# Patient Record
Sex: FEMALE
[De-identification: names, ages, dates, MRNs, and addresses within clinical notes are randomized per-mention and may not be internally consistent; named-entity substitution may affect disease eponyms.]

---

## 2020-04-28 ENCOUNTER — NURSE TRIAGE (OUTPATIENT)
Dept: OTHER | Facility: CLINIC | Age: 65
End: 2020-04-28

## 2020-04-28 NOTE — TELEPHONE ENCOUNTER
Reason for Disposition   Other symptom is present, see that guideline.  (e.g., chest pain, headache, dizziness, abdominal pain, colds, sore throat, etc.).  [1] MODERATE dizziness (e.g., interferes with normal activities) AND [2] has NOT been evaluated by physician for this  (Exception: dizziness caused by heat exposure, sudden standing, or poor fluid intake)    Answer Assessment - Initial Assessment Questions  1. NAUSEA SEVERITY: \"How bad is the nausea? \" (e.g., mild, moderate, severe; dehydration, weight loss)    - MILD: loss of appetite without change in eating habits    - MODERATE: decreased oral intake without significant weight loss, dehydration, or malnutrition    - SEVERE: inadequate caloric or fluid intake, significant weight loss, symptoms of dehydration      Moderate since after lunch today     2. ONSET: \"When did the nausea begin? \"      Today in the afternoon    3. VOMITING: \"Any vomiting? \" If so, ask: \"How many times today? \"       Have not vomited but feels like she could     4. RECURRENT SYMPTOM: \"Have you had nausea before? \" If so, ask: \"When was the last time? \" \"What happened that time? \"       Denies     5. CAUSE: \"What do you think is causing the nausea? \"      Not sure what is causing     6. PREGNANCY: \"Is there any chance you are pregnant? \" (e.g., unprotected intercourse, missed birth control pill, broken condom)      NA    Answer Assessment - Initial Assessment Questions  1. DESCRIPTION: \"Describe your dizziness. \"      Feels too weak to stand, unsteady on feet     2. LIGHTHEADED: \"Do you feel lightheaded? \" (e.g., somewhat faint, woozy, weak upon standing)      Pt feels like she could pass out, weak     3. VERTIGO: \"Do you feel like either you or the room is spinning or tilting? \" (i.e. vertigo)        4. SEVERITY: \"How bad is it? \"  \"Do you feel like you are going to faint? \" \"Can you stand and walk? \"    - MILD - walking normally    - MODERATE - interferes with normal activities (e.g., work, school)     - SEVERE - unable to stand, requires support to walk, feels like passing out now. Moderate- able to still walk on own without assistance but feels weak     5. ONSET:  \"When did the dizziness begin? \"      This afternoon     6. AGGRAVATING FACTORS: \"Does anything make it worse? \" (e.g., standing, change in head position)      Standing, changing of position    7. HEART RATE: \"Can you tell me your heart rate? \" \"How many beats in 15 seconds? \"  (Note: not all patients can do this)        Checked with BP auto cuff- 153/91 and HR 92    8. CAUSE: \"What do you think is causing the dizziness? \"      Unsure    9. RECURRENT SYMPTOM: \"Have you had dizziness before? \" If so, ask: \"When was the last time? \" \"What happened that time? \"      Yes when she gets sick- pt states she is very sensitive when she is sick     10. OTHER SYMPTOMS: \"Do you have any other symptoms? \" (e.g., fever, chest pain, vomiting, diarrhea, bleeding)         Denies fever, feels like vomiting but hasnt, denies CP, \"soft stool' x5    11. PREGNANCY: \"Is there any chance you are pregnant? \" \"When was your last menstrual period? \"        NA    Protocols used: NAUSEA-ADULT-OH, DIZZINESS - LIGHTHEADEDNESS-ADULT-AH    Pt called with MMO benefits. Pt called with symptoms as stated. It was very hard to hear and understand pt, sounded like the phone was being moved around a lot. Had to keep asking pt to repeat herself and informed her that I could not hear her. Caller reports symptoms as documented above. Caller informed of disposition. Care advice as documented. Pt informed that she could go to [facility] or ED since the office was closed. Pt verbalized understanding and agreeable to plan. Please do not respond to the triage nurse through this encounter. Any subsequent communication should be directly with the patient.